# Patient Record
Sex: FEMALE | Race: BLACK OR AFRICAN AMERICAN | Employment: FULL TIME | URBAN - METROPOLITAN AREA
[De-identification: names, ages, dates, MRNs, and addresses within clinical notes are randomized per-mention and may not be internally consistent; named-entity substitution may affect disease eponyms.]

---

## 2017-01-02 ENCOUNTER — HOSPITAL ENCOUNTER (OUTPATIENT)
Dept: PHYSICAL THERAPY | Age: 42
Discharge: HOME | End: 2017-01-02
Payer: COMMERCIAL

## 2017-01-02 PROCEDURE — 97110 THERAPEUTIC EXERCISES: CPT

## 2017-01-02 PROCEDURE — 97014 ELECTRIC STIMULATION THERAPY: CPT

## 2017-01-02 NOTE — PROGRESS NOTES
Anne-Marie Shelley   (:1975) Therapy Center at   [facility]  [address], [address], [place]  [phone]   [phone]         Outpatient PHYSICAL THERAPY: Daily Note 2017  Fall Risk Score: 0 (5+ = High Risk)    ICD-10: Treatment Diagnosis: Cervicalgia (M54.2)  REFERRING PHYSICIAN: Tanya Hand MD MD Orders: evaluate and treat  Return Physician Appointment: unknown   MEDICAL/REFERRING DIAGNOSIS: cervical myofascial pain  DATE OF ONSET: 2016   PRIOR LEVEL OF FUNCTION: Independent   PRECAUTIONS/ALLERGIES:   Allergies   Allergen Reactions    Doxycycline Hives and Swelling     ASSESSMENT:  ?????? ? ? This section established at most recent assessment??????????  Ms. Nasreen Slater presents in therapy today with chronic reports of L neck and upper trapezius pain, following a work-related injury in . She had previously attended PT here at this clinic, for roughly 1 month in August. Today, she states that PT had helped with her pain, and has orders to continue. She will benefit from skilled PT in order to further resolve her left sided neck symptoms for optimum return to work duties. PROBLEM LIST (Impairments causing functional limitations):  1. Decreased Strength affecting function  2. Decreased ADL/Functional Activities  3. Decreased Flexibility/joint mobility  4. Increased Pain affecting function  5. Decreased Activity tolerance   GOALS: (Goals have been discussed and agreed upon with patient.)    SHORT-TERM FUNCTIONAL GOALS: Time Frame: 3 weeks  1. Patient will be compliant with HEP focused on cervical/postural stabilization and strength/ROM. 2.  Patient will rate neck pain no greater than 2-3/10 at worst for improved tolerance to daily and work activities. DISCHARGE GOALS: Time Frame: 6 weeks  1. Patient will be independent with comprehensive HEP focused on cervical stabilization and postural strengthening.    2.  Patient will rate neck pain no greater than 0-1/10 and which does not significantly interfere with daily or work duties. 3.  Patient will demonstrate cervical AROM to be [facility] for improved safety with functional mobility. REHABILITATION POTENTIAL FOR STATED GOALS: [facility]:  INTERVENTIONS PLANNED: (Benefits and precautions of physical therapy have been discussed with the patient.)  1. cold  2. electrical stimulation  3. heat  4. home exercise program (HEP)  5. manual therapy  6. neuromuscular re-education/strengthening  7. range of motion: active/assisted/passive  8. therapeutic activities  9. therapeutic exercise/strengthening  TREATMENT PLAN EFFECTIVE DATES: 12/5/16 TO 1/30/17  FREQUENCY/DURATION: Follow patient 2 times a week for 6 weeks to address above goals. Regarding Caesar Perdomojay Fabio's therapy, I certify that the treatment plan above will be carried out by a therapist or under their direction. Thank you for this referral,  Ludmila Lara, PT, DPT     Referring Physician Signature: Nikolas Marino MD          Date                       SUBJECTIVE:  1/2/2017 Patient reports she is doing well, improved. History of Present Injury/Illness (Reason for Referral): Patient reports a work-related injury on 6-28-16, in which a WC lift struck her in the head. She attended PT for roughly 1 month in August. She states PT helped her pain. She reports an x-ray, and being diagnosed with more muscle involvement than spine. She states she is still out of work, that Dr. Gabriele Richey has released her to return to work, but her neurologist has not. Present Symptoms: Patient reports left sided neck and upper trapezius pain, rated as 5/10 at worst and 0/10 at best. She states her pain runs from her left sided neck to L shoulder. Dominant Side: right  Past Medical History:  has a past medical history of Anxiety; Hypertension; and Interstitial cystitis.   Current Medications:   Current Outpatient Prescriptions:     PARoxetine CR (PAXIL-CR) 25 mg tablet, Take 25 mg by mouth daily. , Disp: , Rfl:     lisinopril (PRINIVIL, ZESTRIL) 20 mg tablet, Take  by mouth daily. , Disp: , Rfl:     MULTIVITAMIN (VITAMIN DAILY PO), Take  by mouth., Disp: , Rfl:     cyclobenzaprine (FLEXERIL) 10 mg tablet, Take 1 Tab by mouth nightly., Disp: 14 Tab, Rfl: 0   Date Last Reviewed: 1/2/2017    Social History/Home Situation: Independent      Work/Activity History: [employer] [facility]) - currently [place]  OBJECTIVE:    Outcome Measure: Tool Used: Neck Disability Index (NDI)  Score:  Initial: 12/50  Most Recent: X/50 (Date: -- )   Interpretation of Score: The Neck Disability Index is a revised form of the Oswestry Low Back Pain Index and is designed to measure the activities of daily living in person's with neck pain. Each section is scored on a 0-5 scale, 5 representing the greatest disability. The scores of each section are added together for a total score of 50. Score 0 1-10 11-20 21-30 31-40 41-49 50   Modifier CH CI CJ CK CL CM CN       Observation/Orthostatic Postural Assessment:    Patient demonstrates forward head posture with bilaterally rounded shoulders. Palpation:    Tenderness and tightness along left upper trapezius muscle belly  ROM:                           Strength: WNL throughout B/L UEs                   Neurological Screen:   Myotomes:                  TREATMENT:    (In addition to Assessment/Re-Assessment sessions the following treatments were rendered)  THERAPEUTIC EXERCISE: (30 minutes):  Exercises per grid below to improve mobility and strength. Required minimal verbal cues to promote proper body alignment, promote proper body posture and promote proper body mechanics. Progressed complexity of movement as indicated.    Date:  12-7-16 Date:  12-12-16 Date:  12-19-16 Date  12-29-16 Date  1-2-16   Activity/Exercise Parameters Parameters Parameters     Patient Education  Discussed incorporating retractions into homeowork      UBE Level 1 5/5 Level 1 5/5 Level 1 5/5 Level 1 5/5 Level 1 5/5   Upper Trap Stretch   15 sec hold x 5 15 sec hold x 5 15 sec hold x 5 15 sec hold x 5 15 sec hold x 5   Scapular protraction      \"Tree hugs\"  7 lb cables  2x10 \"Tree hugs\"  7 lb cables  2x10   Scapular Retractions   5 sec hold x 10 5 sec hold x 10      Rows   Green TB x 15 Blue TB x 15 Blue TB x 15 7 lb cables  2x10 7 lb cables  2x10   Middle Trap   Green TB x 15 Green TB x 15      Extensions   Green TB x 15 Blue TB x 15      Cervical Retractions    2 x10 3x10  With self over-pressure at end range  3x10  With self over-pressure at end range   Cervical Retractions with extension  x10            Manual Therapy (      0):   Therapeutic Modalities: Patient received electrical stimulation to L upper trapezius, 1 channel in direction of mid muscle belly fibers, H-wave with low frequency, intensity set to patient tolerance, patient seated, 10 minutes, performed to decrease pain and improve flexibility at the affected region                                                                                              HEP: As above; handouts given to patient for all exercises. ______________________________________________________________________________________________________    Treatment Assessment:  Patient continues to show progress in her pain. She has 3 more approved sessions. Progression/Medical Necessity:   · Patient is expected to demonstrate progress in strength and range of motion to improve safety during work duty performance. Compliance with Program/Exercises: Will assess as treatment progresses. Reason for Continuation of Services/Other Comments:  · Patient continues to require skilled intervention due to not reaching long term goals. Recommendations/Intent for next treatment session: \"Treatment next visit will focus on advancements to more challenging activities\".     Total Treatment Duration:  PT Patient Time In/Time Out  Time In: 1500  Time Out: 1111 [address] [place] [address], [place], DPT

## 2017-01-04 ENCOUNTER — HOSPITAL ENCOUNTER (OUTPATIENT)
Dept: PHYSICAL THERAPY | Age: 42
Discharge: HOME | End: 2017-01-04
Payer: COMMERCIAL

## 2017-01-04 PROCEDURE — 97014 ELECTRIC STIMULATION THERAPY: CPT

## 2017-01-04 PROCEDURE — 97110 THERAPEUTIC EXERCISES: CPT

## 2017-01-04 NOTE — PROGRESS NOTES
Farhana Torsten   (:1975) [facility]   [facility] [address], [place] [address]  [phone]   [phone]         Outpatient PHYSICAL THERAPY: Daily Note 2017  Fall Risk Score: 0 (5+ = High Risk)    ICD-10: Treatment Diagnosis: Cervicalgia (M54.2)  REFERRING PHYSICIAN: Sera Cardoso MD MD Orders: evaluate and treat  Return Physician Appointment: unknown   MEDICAL/REFERRING DIAGNOSIS: cervical myofascial pain  DATE OF ONSET: 2016   PRIOR LEVEL OF FUNCTION: Independent   PRECAUTIONS/ALLERGIES:   Allergies   Allergen Reactions    Doxycycline Hives and Swelling     ASSESSMENT:  ?????? ? ? This section established at most recent assessment??????????  Ms. Anup Lucero presents in therapy today with chronic reports of L neck and upper trapezius pain, following a work-related injury in . She had previously attended PT here at this clinic, for roughly 1 month in August. Today, she states that PT had helped with her pain, and has orders to continue. She will benefit from skilled PT in order to further resolve her left sided neck symptoms for optimum return to work duties. PROBLEM LIST (Impairments causing functional limitations):  1. Decreased Strength affecting function  2. Decreased ADL/Functional Activities  3. Decreased Flexibility/joint mobility  4. Increased Pain affecting function  5. Decreased Activity tolerance   GOALS: (Goals have been discussed and agreed upon with patient.)    SHORT-TERM FUNCTIONAL GOALS: Time Frame: 3 weeks  1. Patient will be compliant with HEP focused on cervical/postural stabilization and strength/ROM. 2.  Patient will rate neck pain no greater than 2-3/10 at worst for improved tolerance to daily and work activities. DISCHARGE GOALS: Time Frame: 6 weeks  1. Patient will be independent with comprehensive HEP focused on cervical stabilization and postural strengthening.    2.  Patient will rate neck pain no greater than 0-1/10 and which does not significantly interfere with daily or work duties. 3.  Patient will demonstrate cervical AROM to be [facility] for improved safety with functional mobility. REHABILITATION POTENTIAL FOR STATED GOALS: [facility]:  INTERVENTIONS PLANNED: (Benefits and precautions of physical therapy have been discussed with the patient.)  1. cold  2. electrical stimulation  3. heat  4. home exercise program (HEP)  5. manual therapy  6. neuromuscular re-education/strengthening  7. range of motion: active/assisted/passive  8. therapeutic activities  9. therapeutic exercise/strengthening  TREATMENT PLAN EFFECTIVE DATES: 12/5/16 TO 1/30/17  FREQUENCY/DURATION: Follow patient 2 times a week for 6 weeks to address above goals. Regarding Caesar Perdomojay Fabio's therapy, I certify that the treatment plan above will be carried out by a therapist or under their direction. Thank you for this referral,  Ludmila Lara, PT, DPT     Referring Physician Signature: Nikolas Marino MD          Date                       SUBJECTIVE:  1/4/2017 Patient reports she is doing well, improved. History of Present Injury/Illness (Reason for Referral): Patient reports a work-related injury on 6-28-16, in which a WC lift struck her in the head. She attended PT for roughly 1 month in August. She states PT helped her pain. She reports an x-ray, and being diagnosed with more muscle involvement than spine. She states she is still out of work, that Dr. Gabriele Richey has released her to return to work, but her neurologist has not. Present Symptoms: Patient reports left sided neck and upper trapezius pain, rated as 5/10 at worst and 0/10 at best. She states her pain runs from her left sided neck to L shoulder. Dominant Side: right  Past Medical History:  has a past medical history of Anxiety; Hypertension; and Interstitial cystitis.   Current Medications:   Current Outpatient Prescriptions:     PARoxetine CR (PAXIL-CR) 25 mg tablet, Take 25 mg by mouth daily. , Disp: , Rfl:     lisinopril (PRINIVIL, ZESTRIL) 20 mg tablet, Take  by mouth daily. , Disp: , Rfl:     MULTIVITAMIN (VITAMIN DAILY PO), Take  by mouth., Disp: , Rfl:     cyclobenzaprine (FLEXERIL) 10 mg tablet, Take 1 Tab by mouth nightly., Disp: 14 Tab, Rfl: 0   Date Last Reviewed: 1/4/2017    Social History/Home Situation: Independent      Work/Activity History: [employer] [facility] [employer] [facility]) - currently [place]  OBJECTIVE:    Outcome Measure: Tool Used: Neck Disability Index (NDI)  Score:  Initial: 12/50  Most Recent: X/50 (Date: -- )   Interpretation of Score: The Neck Disability Index is a revised form of the Oswestry Low Back Pain Index and is designed to measure the activities of daily living in person's with neck pain. Each section is scored on a 0-5 scale, 5 representing the greatest disability. The scores of each section are added together for a total score of 50. Score 0 1-10 11-20 21-30 31-40 41-49 50   Modifier CH CI CJ CK CL CM CN       Observation/Orthostatic Postural Assessment:    Patient demonstrates forward head posture with bilaterally rounded shoulders. Palpation:    Tenderness and tightness along left upper trapezius muscle belly  ROM:                           Strength: WNL throughout B/L UEs                   Neurological Screen:   Myotomes:                  TREATMENT:    (In addition to Assessment/Re-Assessment sessions the following treatments were rendered)  THERAPEUTIC EXERCISE: (30 minutes):  Exercises per grid below to improve mobility and strength. Required minimal verbal cues to promote proper body alignment, promote proper body posture and promote proper body mechanics. Progressed complexity of movement as indicated.    Date:  12-7-16 Date:  12-12-16 Date:  12-19-16 Date  12-29-16 Date  1-2-16 Date  1-4-16   Activity/Exercise Parameters Parameters Parameters      Patient Education  Discussed incorporating retractions into [facility] UBE   Level 1 5/5 Level 1 5/5 Level 1 5/5 Level 1 5/5 Level 1 5/5 Level 1 5/5   Upper Trap Stretch   15 sec hold x 5 15 sec hold x 5 15 sec hold x 5 15 sec hold x 5 15 sec hold x 5 15 sec hold x 5   Scapular protraction      \"Tree hugs\"  7 lb cables  2x10 \"Tree hugs\"  7 lb cables  2x10 \"Tree hugs\"  7 lb cables  2x10   Scapular Retractions   5 sec hold x 10 5 sec hold x 10       Rows   Green TB x 15 Blue TB x 15 Blue TB x 15 7 lb cables  2x10 7 lb cables  2x10 7 lb cables  2x10   Middle Trap   Green TB x 15 Green TB x 15       Extensions   Green TB x 15 Blue TB x 15       Cervical Retractions    2 x10 3x10  With self over-pressure at end range  3x10  With self over-pressure at end range 3x10  With self over-pressure at end range   Cervical Retractions with extension  x10             Manual Therapy (      0):   Therapeutic Modalities: Patient received electrical stimulation to L upper trapezius, 1 channel in direction of mid muscle belly fibers, H-wave with low frequency, intensity set to patient tolerance, patient seated, 10 minutes, performed to decrease pain and improve flexibility at the affected region                                                                                              HEP: As above; handouts given to patient for all exercises. ______________________________________________________________________________________________________    Treatment Assessment:  Patient is responding well, slow but steady progress. Re-instructed on importance of regular HEP performance of upper trap stretching throughout the day. Progression/Medical Necessity:   · Patient is expected to demonstrate progress in strength and range of motion to improve safety during work duty performance. Compliance with Program/Exercises: Will assess as treatment progresses.    Reason for Continuation of Services/Other Comments:  · Patient continues to require skilled intervention due to not reaching long term goals. Recommendations/Intent for next treatment session: \"Treatment next visit will focus on advancements to more challenging activities\".     Total Treatment Duration:  PT Patient Time In/Time Out  Time In: 1500  Time Out: [address], PT, DPT

## 2017-01-09 ENCOUNTER — APPOINTMENT (OUTPATIENT)
Dept: PHYSICAL THERAPY | Age: 42
End: 2017-01-09
Payer: COMMERCIAL

## 2017-01-11 ENCOUNTER — HOSPITAL ENCOUNTER (OUTPATIENT)
Dept: PHYSICAL THERAPY | Age: 42
Discharge: HOME | End: 2017-01-11
Payer: COMMERCIAL

## 2017-01-11 PROCEDURE — 97014 ELECTRIC STIMULATION THERAPY: CPT

## 2017-01-11 PROCEDURE — 97110 THERAPEUTIC EXERCISES: CPT

## 2017-01-12 NOTE — PROGRESS NOTES
Justine Rdz   (:1975) [address]  at   [facility]  [address] , [address], [place]  [phone]   [phone]         Outpatient PHYSICAL THERAPY: Daily Note 2017  Fall Risk Score: 0 (5+ = High Risk)    ICD-10: Treatment Diagnosis: Cervicalgia (M54.2)  REFERRING PHYSICIAN: Nikolas Marino MD MD Orders: evaluate and treat  Return Physician Appointment: unknown   MEDICAL/REFERRING DIAGNOSIS: cervical myofascial pain  DATE OF ONSET: 2016   PRIOR LEVEL OF FUNCTION: Independent   PRECAUTIONS/ALLERGIES:   Allergies   Allergen Reactions    Doxycycline Hives and Swelling     ASSESSMENT:  ?????? ? ? This section established at most recent assessment??????????  Ms. Nasreen Lagunas presents in therapy today with chronic reports of L neck and upper trapezius pain, following a work-related injury in . She had previously attended PT here at this clinic, for roughly 1 month in August. Today, she states that PT had helped with her pain, and has orders to continue. She will benefit from skilled PT in order to further resolve her left sided neck symptoms for optimum return to work duties. PROBLEM LIST (Impairments causing functional limitations):  1. Decreased Strength affecting function  2. Decreased ADL/Functional Activities  3. Decreased Flexibility/joint mobility  4. Increased Pain affecting function  5. Decreased Activity tolerance   GOALS: (Goals have been discussed and agreed upon with patient.)    SHORT-TERM FUNCTIONAL GOALS: Time Frame: 3 weeks  1. Patient will be compliant with HEP focused on cervical/postural stabilization and strength/ROM. 2.  Patient will rate neck pain no greater than 2-3/10 at worst for improved tolerance to daily and work activities. DISCHARGE GOALS: Time Frame: 6 weeks  1. Patient will be independent with comprehensive HEP focused on cervical stabilization and postural strengthening.    2.  Patient will rate neck pain no greater than 0-1/10 and which does not significantly interfere with daily or work duties. 3.  Patient will demonstrate cervical AROM to be [facility] for improved safety with functional mobility. REHABILITATION POTENTIAL FOR STATED GOALS: Merlin Smiling OF CARE:  INTERVENTIONS PLANNED: (Benefits and precautions of physical therapy have been discussed with the patient.)  1. cold  2. electrical stimulation  3. heat  4. home exercise program (HEP)  5. manual therapy  6. neuromuscular re-education/strengthening  7. range of motion: active/assisted/passive  8. therapeutic activities  9. therapeutic exercise/strengthening  TREATMENT PLAN EFFECTIVE DATES: 12/5/16 TO 1/30/17  FREQUENCY/DURATION: Follow patient 2 times a week for 6 weeks to address above goals. Regarding Konrad Mccarthy's therapy, I certify that the treatment plan above will be carried out by a therapist or under their direction. Thank you for this referral,  Marek Ma, PT, DPT     Referring Physician Signature: Guevara Gill MD          Date                       SUBJECTIVE:  1/12/2017 Patient reports she has been dealing with chronic bladder issues, and it is leading to depression. She states she is seeing a psychiatrist this week. History of Present Injury/Illness (Reason for Referral): Patient reports a work-related injury on 6-28-16, in which a WC lift struck her in the head. She attended PT for roughly 1 month in August. She states PT helped her pain. She reports an x-ray, and being diagnosed with more muscle involvement than spine. She states she is still out of work, that Dr. Edie Mcbride has released her to return to work, but her neurologist has not. Present Symptoms: Patient reports left sided neck and upper trapezius pain, rated as 5/10 at worst and 0/10 at best. She states her pain runs from her left sided neck to L shoulder. Dominant Side: right  Past Medical History:  has a past medical history of Anxiety;  Hypertension; and Interstitial cystitis. Current Medications:   Current Outpatient Prescriptions:     PARoxetine CR (PAXIL-CR) 25 mg tablet, Take 25 mg by mouth daily. , Disp: , Rfl:     lisinopril (PRINIVIL, ZESTRIL) 20 mg tablet, Take  by mouth daily. , Disp: , Rfl:     MULTIVITAMIN (VITAMIN DAILY PO), Take  by mouth., Disp: , Rfl:     cyclobenzaprine (FLEXERIL) 10 mg tablet, Take 1 Tab by mouth nightly., Disp: 14 Tab, Rfl: 0   Date Last Reviewed: 1/12/2017    Social History/Home Situation: Independent      Work/Activity History: [employer]) - currently [employer]  OBJECTIVE:    Outcome Measure: Tool Used: Neck Disability Index (NDI)  Score:  Initial: 12/50  Most Recent: X/50 (Date: -- )   Interpretation of Score: The Neck Disability Index is a revised form of the Oswestry Low Back Pain Index and is designed to measure the activities of daily living in person's with neck pain. Each section is scored on a 0-5 scale, 5 representing the greatest disability. The scores of each section are added together for a total score of 50. Score 0 1-10 11-20 21-30 31-40 41-49 50   Modifier CH CI CJ CK CL CM CN       Observation/Orthostatic Postural Assessment:    Patient demonstrates forward head posture with bilaterally rounded shoulders. Palpation:    Tenderness and tightness along left upper trapezius muscle belly  ROM:                           Strength: WNL throughout B/L UEs                   Neurological Screen:   Myotomes:                  TREATMENT:    (In addition to Assessment/Re-Assessment sessions the following treatments were rendered)  THERAPEUTIC EXERCISE: (30 minutes):  Exercises per grid below to improve mobility and strength. Required minimal verbal cues to promote proper body alignment, promote proper body posture and promote proper body mechanics. Progressed complexity of movement as indicated.    Date:  12-7-16 Date:  12-12-16 Date:  12-19-16 Date  12-29-16 Date  1-2-16 Date  1-11-16 Activity/Exercise Parameters Parameters Parameters      Patient Education  Discussed incorporating retractions into homeowork       UBE   Level 1 5/5 Level 1 5/5 Level 1 5/5 Level 1 5/5 Level 1 5/5 Level 1 5/5   Upper Trap Stretch   15 sec hold x 5 15 sec hold x 5 15 sec hold x 5 15 sec hold x 5 15 sec hold x 5 15 sec hold x 5   Scapular protraction      \"Tree hugs\"  7 lb cables  2x10 \"Tree hugs\"  7 lb cables  2x10 \"Tree hugs\"  7 lb cables  2x10   Scapular Retractions   5 sec hold x 10 5 sec hold x 10       Rows   Green TB x 15 Blue TB x 15 Blue TB x 15 7 lb cables  2x10 7 lb cables  2x10 7 lb cables  2x10   Middle Trap   Green TB x 15 Green TB x 15       Extensions   Green TB x 15 Blue TB x 15       Cervical Retractions    2 x10 3x10  With self over-pressure at end range  3x10  With self over-pressure at end range 3x10  With self over-pressure at end range   Cervical Retractions with extension  x10             Manual Therapy (      0):   Therapeutic Modalities: Patient received electrical stimulation to L upper trapezius, 1 channel in direction of mid muscle belly fibers, H-wave with low frequency, intensity set to patient tolerance, patient seated, 10 minutes, performed to decrease pain and improve flexibility at the affected region                                                                                              HEP: As above; handouts given to patient for all exercises. ______________________________________________________________________________________________________    Treatment Assessment:  Patient was not as motivated today and seemed upset with her current situation. She states it's more related to a bladder disease she has, but is seeking help with her depression. I gave her encouragement and will look to discharge next session, as it is her last approved PT visit.      Progression/Medical Necessity:   · Patient is expected to demonstrate progress in strength and range of motion to improve safety during work duty performance. Compliance with Program/Exercises: Will assess as treatment progresses. Reason for Continuation of Services/Other Comments:  · Patient continues to require skilled intervention due to not reaching long term goals. Recommendations/Intent for next treatment session: \"Treatment next visit will focus on advancements to more challenging activities\".     Total Treatment Duration:  PT Patient Time In/Time Out  Time In: 1500  Time Out: [address], PT, DPT

## 2017-01-23 ENCOUNTER — HOSPITAL ENCOUNTER (OUTPATIENT)
Dept: PHYSICAL THERAPY | Age: 42
Discharge: HOME | End: 2017-01-23
Payer: COMMERCIAL

## 2017-01-23 PROCEDURE — 97110 THERAPEUTIC EXERCISES: CPT

## 2017-01-23 PROCEDURE — 97014 ELECTRIC STIMULATION THERAPY: CPT

## 2017-01-23 NOTE — PROGRESS NOTES
Kelton Watts   (:1975) [address]  at   [facility]  [address], [address], [place]  [phone]   [phone]         Outpatient PHYSICAL THERAPY: Daily Note and Discharge 2017  Fall Risk Score: 0 (5+ = High Risk)    ICD-10: Treatment Diagnosis: Cervicalgia (M54.2)  REFERRING PHYSICIAN: Vicky Porras MD MD Orders: evaluate and treat  Return Physician Appointment: unknown   MEDICAL/REFERRING DIAGNOSIS: cervical myofascial pain  DATE OF ONSET: 2016   PRIOR LEVEL OF FUNCTION: Independent   PRECAUTIONS/ALLERGIES:   Allergies   Allergen Reactions    Doxycycline Hives and Swelling     ASSESSMENT:  ?????? ? ? This section established at most recent assessment??????????  Ms. Camilla Vega initiated therapy for her neck pain on 16 and has attended 12 out of her 12 approved therapy sessions. She states she is much better, reporting less pain (0-1/10 currently) and improved function. She is agreeable for discharge today from formal PT, with instruction to continue with her home exercises. PROBLEM LIST (Impairments causing functional limitations):  1. Decreased Strength affecting function  2. Decreased ADL/Functional Activities  3. Decreased Flexibility/joint mobility  4. Increased Pain affecting function  5. Decreased Activity tolerance   GOALS: (Goals have been discussed and agreed upon with patient.)    ALL GOALS MET 2017    SHORT-TERM FUNCTIONAL GOALS: Time Frame: 3 weeks  1. Patient will be compliant with HEP focused on cervical/postural stabilization and strength/ROM. 2.  Patient will rate neck pain no greater than 2-3/10 at worst for improved tolerance to daily and work activities. DISCHARGE GOALS: Time Frame: 6 weeks  1. Patient will be independent with comprehensive HEP focused on cervical stabilization and postural strengthening.    2.  Patient will rate neck pain no greater than 0-1/10 and which does not significantly interfere with daily or work duties. 3.  Patient will demonstrate cervical AROM to be [facility] for improved safety with functional mobility. REHABILITATION POTENTIAL FOR STATED GOALS: Molina Rogers OF CARE:  INTERVENTIONS PLANNED: (Benefits and precautions of physical therapy have been discussed with the patient.)  1. cold  2. electrical stimulation  3. heat  4. home exercise program (HEP)  5. manual therapy  6. neuromuscular re-education/strengthening  7. range of motion: active/assisted/passive  8. therapeutic activities  9. therapeutic exercise/strengthening  TREATMENT PLAN EFFECTIVE DATES: 12/5/16 TO 1/23/17    Regarding Chloé Mccarthy's therapy, I certify that the treatment plan above will be carried out by a therapist or under their direction. Thank you for this referral,  Christina Asif, PT, DPT     Referring Physician Signature: Jose Gan MD          Date                       SUBJECTIVE:  1/23/2017 Patient reports she feels much better, 0-1/10 currently, and at most 2/10, which is episodic. History of Present Injury/Illness (Reason for Referral): Patient reports a work-related injury on 6-28-16, in which a WC lift struck her in the head. She attended PT for roughly 1 month in August. She states PT helped her pain. She reports an x-ray, and being diagnosed with more muscle involvement than spine. She states she is still out of work, that Dr. Kyler Dinh has released her to return to work, but her neurologist has not. Present Symptoms: Patient reports left sided neck and upper trapezius pain, rated as 5/10 at worst and 0/10 at best. She states her pain runs from her left sided neck to L shoulder. Dominant Side: right  Past Medical History:  has a past medical history of Anxiety; Hypertension; and Interstitial cystitis. Current Medications:   Current Outpatient Prescriptions:     PARoxetine CR (PAXIL-CR) 25 mg tablet, Take 25 mg by mouth daily. , Disp: , Rfl:     lisinopril (PRINIVIL, ZESTRIL) 20 mg tablet, Take  by mouth daily. , Disp: , Rfl:     MULTIVITAMIN (VITAMIN DAILY PO), Take  by mouth., Disp: , Rfl:     cyclobenzaprine (FLEXERIL) 10 mg tablet, Take 1 Tab by mouth nightly., Disp: 14 Tab, Rfl: 0   Date Last Reviewed: 1/23/2017    Social History/Home Situation: Independent      Work/Activity History: [employer]) - currently [employer]  OBJECTIVE:    Outcome Measure: Tool Used: Neck Disability Index (NDI)  Score:  Initial: 12/50  Most Recent: 13/50 (Date: 1-23-17 )   Interpretation of Score: The Neck Disability Index is a revised form of the Oswestry Low Back Pain Index and is designed to measure the activities of daily living in person's with neck pain. Each section is scored on a 0-5 scale, 5 representing the greatest disability. The scores of each section are added together for a total score of 50. Score 0 1-10 11-20 21-30 31-40 41-49 50   Modifier CH CI CJ CK CL CM CN       Observation/Orthostatic Postural Assessment:    Patient demonstrates forward head posture with bilaterally rounded shoulders. Palpation:    No significant tenderness at L upper trapezius   ROM:   Cervical Flexion (% of Normal): 100  Cervical Extension (% of Normal): 75  Cervical Left Lateral (% of Normal): 100  Cervical Left Rotation (% of Normal): 100  Cervical Right Lateral (% of Normal): 100  Cervical Right Rotation (% of Normal): 100                       Strength: WNL throughout B/L UEs                   Neurological Screen:   Myotomes:                  TREATMENT:    (In addition to Assessment/Re-Assessment sessions the following treatments were rendered)  THERAPEUTIC EXERCISE: (30 minutes):  Exercises per grid below to improve mobility and strength. Required minimal verbal cues to promote proper body alignment, promote proper body posture and promote proper body mechanics. Progressed complexity of movement as indicated.    Date:  12-7-16 Date:  12-12-16 Date:  12-19-16 Date  12-29-16 Date  1-2-16 Date  1-23-16   Activity/Exercise Parameters Parameters Parameters      Patient Education  Discussed incorporating retractions into homeowork       UBE   Level 1 5/5 Level 1 5/5 Level 1 5/5 Level 1 5/5 Level 1 5/5 Level 1 5/5   Upper Trap Stretch   15 sec hold x 5 15 sec hold x 5 15 sec hold x 5 15 sec hold x 5 15 sec hold x 5 15 sec hold x 5   Scapular protraction      \"Tree hugs\"  7 lb cables  2x10 \"Tree hugs\"  7 lb cables  2x10 \"Tree hugs\"  7 lb cables  2x10   Scapular Retractions   5 sec hold x 10 5 sec hold x 10       Rows   Green TB x 15 Blue TB x 15 Blue TB x 15 7 lb cables  2x10 7 lb cables  2x10 7 lb cables  2x10   Middle Trap   Green TB x 15 Green TB x 15       Extensions   Green TB x 15 Blue TB x 15       Cervical Retractions    2 x10 3x10  With self over-pressure at end range  3x10  With self over-pressure at end range 3x10  With self over-pressure at end range   Cervical Retractions with extension  x10             Manual Therapy (      0):   Therapeutic Modalities: Patient received electrical stimulation to L upper trapezius, 1 channel in direction of mid muscle belly fibers, H-wave with low frequency, intensity set to patient tolerance, patient seated, 10 minutes, performed to decrease pain and improve flexibility at the affected region                                                                                              HEP: As above; handouts given to patient for all exercises. ______________________________________________________________________________________________________    Treatment Assessment:  Patient is agreeable to discharge with instructions on continued HEP performance. Instructed to contact office with any questions/concerns.        Total Treatment Duration:  PT Patient Time In/Time Out  Time In: 1500  Time Out: [address], PT, DPT

## 2023-09-26 ENCOUNTER — TELEPHONE (OUTPATIENT)
Dept: PRIMARY CARE CLINIC | Facility: CLINIC | Age: 48
End: 2023-09-26